# Patient Record
Sex: FEMALE | Race: WHITE | Employment: FULL TIME | URBAN - METROPOLITAN AREA
[De-identification: names, ages, dates, MRNs, and addresses within clinical notes are randomized per-mention and may not be internally consistent; named-entity substitution may affect disease eponyms.]

---

## 2017-02-09 ENCOUNTER — HOSPITAL ENCOUNTER (OUTPATIENT)
Dept: MAMMOGRAPHY | Age: 52
Discharge: HOME | End: 2017-02-09
Attending: FAMILY MEDICINE
Payer: SELF-PAY

## 2017-02-09 DIAGNOSIS — Z12.39 BREAST SCREENING: ICD-10-CM

## 2017-02-09 PROCEDURE — 77067 SCR MAMMO BI INCL CAD: CPT

## 2019-03-06 ENCOUNTER — HOSPITAL ENCOUNTER (OUTPATIENT)
Dept: MAMMOGRAPHY | Age: 54
Discharge: HOME | End: 2019-03-06
Attending: FAMILY MEDICINE
Payer: COMMERCIAL

## 2019-03-06 DIAGNOSIS — Z12.31 VISIT FOR SCREENING MAMMOGRAM: ICD-10-CM

## 2019-03-06 PROCEDURE — 77067 SCR MAMMO BI INCL CAD: CPT

## 2021-03-12 ENCOUNTER — TRANSCRIBE ORDER (OUTPATIENT)
Dept: SCHEDULING | Age: 56
End: 2021-03-12

## 2021-03-12 DIAGNOSIS — Z12.31 SCREENING MAMMOGRAM FOR HIGH-RISK PATIENT: Primary | ICD-10-CM

## 2021-03-16 ENCOUNTER — HOSPITAL ENCOUNTER (OUTPATIENT)
Dept: MAMMOGRAPHY | Age: 56
Discharge: HOME | End: 2021-03-16
Attending: FAMILY MEDICINE
Payer: COMMERCIAL

## 2021-03-16 DIAGNOSIS — Z12.31 SCREENING MAMMOGRAM FOR HIGH-RISK PATIENT: ICD-10-CM

## 2021-03-16 PROCEDURE — 77067 SCR MAMMO BI INCL CAD: CPT

## 2024-01-18 ENCOUNTER — PROCEDURE VISIT (OUTPATIENT)
Dept: NEUROLOGY | Age: 59
End: 2024-01-18

## 2024-01-18 VITALS — HEART RATE: 74 BPM | OXYGEN SATURATION: 98 % | HEIGHT: 65 IN | WEIGHT: 187 LBS | BODY MASS INDEX: 31.16 KG/M2

## 2024-01-18 DIAGNOSIS — G56.03 BILATERAL CARPAL TUNNEL SYNDROME: ICD-10-CM

## 2024-01-18 DIAGNOSIS — R20.2 PARESTHESIA OF BOTH HANDS: Primary | ICD-10-CM

## 2024-01-18 NOTE — PROGRESS NOTES
EMG/Nerve Conduction Study Procedure Note  [address]    [address]  [place]  [address]   [phone]      Hx:    Exam:     58 y.o.  W  female EMG uppers  re:  CTS...  soft thenars.  Sensory and motor changes hand.  Painful left radial tendon.  History of early diabetes or prediabetes.  No chemo.  History of cervical spine surgeries.  Ref:   Dr. Curtis [facility]    [facility]  Technologist: Gagan Kebede  Height: 5 foot 5 inch        Summary            needle EMG limited selected hand muscles bilaterally with CV.          Controlled environmental factors / EMG lab.  Temperature.   NCV : sensory segments:    Markedly abnormal = absent //NO RESPONSE SNAP bilateral median nerves.  Normal bilateral ulnar bilateral radial SCV.  NCV transcarpal sensory segments:     Markedly abnormal = the bilateral transcarpal median SNAP = ABSENT //no response with normal transcarpal ulnar SCV.  NCV Motor MCV segments:     Markedly abnormal = bilateral median terminal latencies are prolonged significantly and severely with attenuated CMAP amplitudes = left TL is 8.63 ms (UL = 4.15 ms); right TL is 8.10 ms.  Ulnar MCV = bilaterally are normal.  F-wave studies:          Markedly abnormal bilateral median F waves are prolonged and delayed significantly.  Normal bilateral ulnar F waves.   NEEDLE EMG:   Tested muscles::   Normal bilateral APB needle testing =  Normal insertional activity and interference pattern/recruitment.  No fasciculations fibrillations positive sharp waves.  Normal MUP.  No BSS AP.  No giant MUP.  No myotonia.  No upper motor neuron sign.          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  NO DEFINITIVE UNDERLYING CAUSE ALTHOUGH POSSIBLE PREDIABETIC.  NO OTHER ABNORMALITIES IDENTIFIED.  NO AXONAL NEEDLE ABNORMALITY.  NO EVIDENCE FOR OTHER DENERVATION.              CONCLUSION:      Compatible with markedly severe extreme